# Patient Record
Sex: MALE | Race: AMERICAN INDIAN OR ALASKA NATIVE
[De-identification: names, ages, dates, MRNs, and addresses within clinical notes are randomized per-mention and may not be internally consistent; named-entity substitution may affect disease eponyms.]

---

## 2019-04-15 ENCOUNTER — HOSPITAL ENCOUNTER (INPATIENT)
Dept: HOSPITAL 5 - ED | Age: 44
LOS: 2 days | Discharge: LEFT BEFORE DISCHARGE | DRG: 395 | End: 2019-04-17
Attending: INTERNAL MEDICINE | Admitting: INTERNAL MEDICINE
Payer: SELF-PAY

## 2019-04-15 DIAGNOSIS — E11.65: ICD-10-CM

## 2019-04-15 DIAGNOSIS — Z23: ICD-10-CM

## 2019-04-15 DIAGNOSIS — Z82.49: ICD-10-CM

## 2019-04-15 DIAGNOSIS — F12.90: ICD-10-CM

## 2019-04-15 DIAGNOSIS — K61.0: Primary | ICD-10-CM

## 2019-04-15 DIAGNOSIS — K62.89: ICD-10-CM

## 2019-04-15 DIAGNOSIS — F17.200: ICD-10-CM

## 2019-04-15 LAB
ALBUMIN SERPL-MCNC: 3.3 G/DL (ref 3.9–5)
ALT SERPL-CCNC: 11 UNITS/L (ref 7–56)
BASOPHILS # (AUTO): 0.1 K/MM3 (ref 0–0.1)
BASOPHILS NFR BLD AUTO: 0.5 % (ref 0–1.8)
BUN SERPL-MCNC: 15 MG/DL (ref 9–20)
BUN/CREAT SERPL: 13 %
CALCIUM SERPL-MCNC: 8.8 MG/DL (ref 8.4–10.2)
EOSINOPHIL # BLD AUTO: 0.2 K/MM3 (ref 0–0.4)
EOSINOPHIL NFR BLD AUTO: 1.5 % (ref 0–4.3)
HCT VFR BLD CALC: 33.9 % (ref 35.5–45.6)
HEMOLYSIS INDEX: 10
HGB BLD-MCNC: 11.3 GM/DL (ref 11.8–15.2)
LYMPHOCYTES # BLD AUTO: 0.9 K/MM3 (ref 1.2–5.4)
LYMPHOCYTES NFR BLD AUTO: 9.2 % (ref 13.4–35)
MCHC RBC AUTO-ENTMCNC: 33 % (ref 32–34)
MCV RBC AUTO: 85 FL (ref 84–94)
MONOCYTES # (AUTO): 0.7 K/MM3 (ref 0–0.8)
MONOCYTES % (AUTO): 6.9 % (ref 0–7.3)
PLATELET # BLD: 254 K/MM3 (ref 140–440)
RBC # BLD AUTO: 3.98 M/MM3 (ref 3.65–5.03)

## 2019-04-15 PROCEDURE — 82140 ASSAY OF AMMONIA: CPT

## 2019-04-15 PROCEDURE — 87076 CULTURE ANAEROBE IDENT EACH: CPT

## 2019-04-15 PROCEDURE — 80048 BASIC METABOLIC PNL TOTAL CA: CPT

## 2019-04-15 PROCEDURE — 87116 MYCOBACTERIA CULTURE: CPT

## 2019-04-15 PROCEDURE — 80053 COMPREHEN METABOLIC PANEL: CPT

## 2019-04-15 PROCEDURE — 90732 PPSV23 VACC 2 YRS+ SUBQ/IM: CPT

## 2019-04-15 PROCEDURE — 36415 COLL VENOUS BLD VENIPUNCTURE: CPT

## 2019-04-15 PROCEDURE — 99406 BEHAV CHNG SMOKING 3-10 MIN: CPT

## 2019-04-15 PROCEDURE — 85025 COMPLETE CBC W/AUTO DIFF WBC: CPT

## 2019-04-15 PROCEDURE — 74177 CT ABD & PELVIS W/CONTRAST: CPT

## 2019-04-15 PROCEDURE — 82962 GLUCOSE BLOOD TEST: CPT

## 2019-04-15 PROCEDURE — 87186 SC STD MICRODIL/AGAR DIL: CPT

## 2019-04-15 NOTE — EMERGENCY DEPARTMENT REPORT
<ELIS ROMERO - Last Filed: 04/16/19 03:57>





- General


Chief complaint: Skin/Abscess/Foreign Body


Stated complaint: BUTT SWOLLEN/FEVER


Time Seen by Provider: 04/15/19 21:05


Source: patient


Mode of arrival: Ambulatory


Limitations: No Limitations





- History of Present Illness


Initial comments: 





44-year-old -American male with a past medical history of diabetes comes 

in concerned for an abscess on his buttocks.  Patient reports that this going on

for 4 days.  Patient reports this had a fever.  Patient reports is able to eat 

and drink well.  He is on NovoLog 70/30. 


MD complaint: abscess/boil


-: days(s) (4)


Severity scale (0 -10): 10


Quality: aching, sharp


Improves with: none


Worsens with: palpation


Associated symptoms: fever


Treatments Prior to Arrival: none





- Related Data


                                    Allergies











Allergy/AdvReac Type Severity Reaction Status Date / Time


 


No Known Allergies Allergy   Verified 04/16/19 04:41














Abscess Boil HPI





- HPI


Chief Complaint: Skin/Abscess/Foreign Body


Stated Complaint: BUTT SWOLLEN/FEVER


Time Seen by Provider: 04/15/19 21:05


Allergies/Adverse Reactions: 


                                    Allergies











Allergy/AdvReac Type Severity Reaction Status Date / Time


 


No Known Allergies Allergy   Verified 04/16/19 04:41














ED Review of Systems


Comment: All other systems reviewed and negative


Constitutional: chills, fever


Genitourinary: other (rectal pain)


Musculoskeletal: denies: back pain, joint swelling, arthralgia


Skin: denies: rash, lesions


Neurological: denies: headache, weakness, paresthesias


Psychiatric: denies: anxiety, depression





ED Past Medical Hx





- Past Medical History


Hx Diabetes: Yes





- Social History


Smoking Status: Current Every Day Smoker


Substance Use Type: Marijuana





ED Physical Exam





- General


Limitations: No Limitations


General appearance: alert, in no apparent distress





- Head


Head exam: Present: atraumatic, normocephalic





- Eye


Eye exam: Present: PERRL, EOMI





- ENT


ENT exam: Present: mucous membranes moist





- 


External exam: Present: other (rectal tenderness, superior to the rectum 

tenderness to palpate feels a mild mass most likely a deep abscess)





- Extremities Exam


Extremities exam: Present: normal inspection, full ROM





- Back Exam


Back exam: Present: full ROM





- Neurological Exam


Neurological exam: Present: alert, oriented X3





- Psychiatric


Psychiatric exam: Present: normal affect, normal mood





- Skin


Skin exam: Present: warm, dry, intact, normal color.  Absent: rash





ED Course





- Reevaluation(s)


Reevaluation #1: 





04/16/19 03:57


Spoke to Dr. Tolbert to admit patient and to consult Dr. Krishnan





ED Medical Decision Making





- Lab Data


Result diagrams: 


                                 04/15/19 21:10





                                 04/15/19 21:10





ED Disposition


Clinical Impression: 


 Perianal abscess, Proctitis, Diabetes





Disposition: DC-09 OP ADMIT IP TO THIS [facility]


Condition: Stable





<GERALD MACEDO - Last Filed: 04/16/19 05:07>





ED Review of Systems


ROS: 


Stated complaint: BUTT SWOLLEN/FEVER


Other details as noted in HPI








ED Course


                                   Vital Signs











  04/15/19 04/15/19 04/15/19





  20:10 21:07 23:37


 


Temperature 99.1 F 99 F 


 


Pulse Rate 109 H 106 H 100 H


 


Respiratory 18 18 18





Rate   


 


Blood Pressure 103/53 103/53 


 


Blood Pressure   129/60





[Left]   


 


O2 Sat by Pulse 98 98 98





Oximetry   














- Consultations


Consultation #1: 





04/16/19 03:45


case d/w DR Krishnan will consult, requests hospitalist admission








ED Medical Decision Making





- Lab Data


Result diagrams: 


                                 04/16/19 04:47





                                 04/15/19 21:10





- Medical Decision Making





I also examined pt


area of tenderness with fullness at 12 at the anal area


no external hemorrhoids noted


case d/w Surgery on call Dr terra Lucas ordered


hospitalist to admit


Critical care attestation.: 


If time is entered above; I have spent that time in minutes in the direct care 

of this critically ill patient, excluding procedure time.








ED Disposition


Is pt being admited?: Yes


Time of Disposition: 04:00 (DR Tolbert/hosp)

## 2019-04-15 NOTE — EMERGENCY DEPARTMENT REPORT
Chief Complaint: Skin/Abscess/Foreign Body


Stated Complaint: BUTT SWOLLEN/FEVER


Time Seen by Provider: 04/15/19 21:05





- HPI


History of Present Illness: 








pt presents with rectal pain and pain in the gluteal cleft that began two days 

ago 


pt states he felt a lump


states he could smell a foul odor 


subjective fever


denies any pain, swelling, or erythema to the scrotum 





hx of DM








- Exam


Vital Signs: 


                                   Vital Signs











  04/15/19





  20:10


 


Temperature 99.1 F


 


Pulse Rate 109 H


 


Respiratory 18





Rate 


 


Blood Pressure 103/53


 


O2 Sat by Pulse 98





Oximetry 











MSE screening note: 


Focused history performed 


Due to findings the following was ordered: labs 











ED Disposition for MSE


Condition: Stable

## 2019-04-16 LAB
BASOPHILS # (AUTO): 0 K/MM3 (ref 0–0.1)
BASOPHILS NFR BLD AUTO: 0.3 % (ref 0–1.8)
BUN SERPL-MCNC: 15 MG/DL (ref 9–20)
BUN/CREAT SERPL: 14 %
CALCIUM SERPL-MCNC: 7.8 MG/DL (ref 8.4–10.2)
EOSINOPHIL # BLD AUTO: 0.2 K/MM3 (ref 0–0.4)
EOSINOPHIL NFR BLD AUTO: 1.8 % (ref 0–4.3)
HCT VFR BLD CALC: 32.3 % (ref 35.5–45.6)
HEMOLYSIS INDEX: 2
HGB BLD-MCNC: 10.7 GM/DL (ref 11.8–15.2)
LYMPHOCYTES # BLD AUTO: 1.6 K/MM3 (ref 1.2–5.4)
LYMPHOCYTES NFR BLD AUTO: 14.4 % (ref 13.4–35)
MCHC RBC AUTO-ENTMCNC: 33 % (ref 32–34)
MCV RBC AUTO: 85 FL (ref 84–94)
MONOCYTES # (AUTO): 0.8 K/MM3 (ref 0–0.8)
MONOCYTES % (AUTO): 7 % (ref 0–7.3)
PLATELET # BLD: 245 K/MM3 (ref 140–440)
RBC # BLD AUTO: 3.78 M/MM3 (ref 3.65–5.03)

## 2019-04-16 RX ADMIN — SODIUM CHLORIDE SCH MLS/HR: 0.45 INJECTION, SOLUTION INTRAVENOUS at 08:51

## 2019-04-16 RX ADMIN — METRONIDAZOLE SCH MLS/HR: 5 INJECTION, SOLUTION INTRAVENOUS at 17:13

## 2019-04-16 RX ADMIN — ENOXAPARIN SODIUM SCH MG: 100 INJECTION SUBCUTANEOUS at 10:23

## 2019-04-16 RX ADMIN — Medication SCH ML: at 10:23

## 2019-04-16 RX ADMIN — Medication SCH ML: at 22:11

## 2019-04-16 NOTE — CONSULTATION
REASON FOR CONSULTATION:  Rule out hemorrhoids versus rule out perirectal

abscess versus rule out proctitis.



HISTORY OF PRESENT ILLNESS:  The patient is a 44-year-old juvenile diabetic

male, who was admitted at this time with a chief complaint of perianal pain. 

Also had noticed recent blood in his stools.  The patient states he had noticed

a lump in the perianal area, but it apparently spontaneously drained and the

lump has since disappeared.  The patient states he is "



PAST MEDICAL HISTORY:  Pertinent for juvenile diabetes.  Also, left eye

blindness.



PAST SURGICAL HISTORY:  Status post left eye surgery secondary to retinal

detachment.  Left eye currently blind.



ALLERGIES:  No known allergies.



MEDICATIONS:  Insulin.



FAMILY HISTORY:  Diabetes and hypertension.



SOCIAL HISTORY:  Smokes cigars.  Occasional marijuana.  Denies any cigarette

smoking or ethanol intake.



PHYSICAL EXAMINATION:

GENERAL:  At this time revealed the patient to be awake, alert, cooperative,

lying in left lateral decubitus position.  No acute distress.

VITAL SIGNS:  Show him to be afebrile with a temperature of 98.4, blood pressure

130/71, pulse of 84, respirations of 17.

ABDOMEN:  Examination of the rectum reveals some tenderness and a very small

area of induration at approximately 12 o' clock in the jackknife position. 

However, there is no fluctuance or drainage noted at this time.  The

lore-gluteal area reveals no signs of a gluteal abscess or perirectal abscess.



LABORATORY DATA:  Lab work at present includes a CBC, which shows a white count

of 11.2, H and H is 10.7 and 32.3.  Blood sugar is elevated at 414. 

Electrolytes are essentially within normal limits.  BUN is 15 and creatinine is

1.1.  A CT scan of the abdomen had been performed, whose impression reads,

prominence in the mucosa in the rectum suspicious for proctitis.  There is a

small 2 cm low density focus in the perianal region, which may represent a small

abscess; however, that CT cuts were not done low enough to properly delineate.



IMPRESSION:

1.  At this time is that of a 44-year-old juvenile onset diabetic rule out small

perianal abscess, which has spontaneously drained.

2.  Rule out proctitis.



RECOMMENDATIONS:  At this time is that to continue present care of IV

antibiotics.  Also, we will begin Sitz baths q.i.d.  Glucose monitoring and

blood sugar control.  We will also obtain GI evaluation for proctitis, possible

flexible sigmoidoscopy or even possible colonoscopy.  We will follow up the

white count in the morning and monitor clinically.  I will follow up with you.



Thank you very much for consultation.





DD: 04/16/2019 07:51

DT: 04/16/2019 17:02

JOB# [id number]  [id number]

BÁRBARA/KENNY

## 2019-04-16 NOTE — GASTROENTEROLOGY CONSULTATION
<TIM GREEN - Last Filed: 04/16/19 12:38>





History of Present Illness





- Reason for Consult


Consult date: 04/16/19


proctitis


Requesting physician: AGUEDA BARNES





- History of Present Illness


Patient is a 43 y/o male with PMH of diabetes who presented to ED with c/o 

rectal pain with associated bloody drainage, odorous smell, and fever. Upon 

admission he underwent an abd CT that showed a small perianal abscess and 

proctitis to which he was admitted. Surgery following with recommendations for 

further evaluation of proctitis with possible endoscopic evaluation (flex 

sig/colonoscopy) to which GI has been consulted. This afternoon patient was 

sitting in bedside chair w/o acute distress. He reports feeling better with 

rectal pain improving on antibiotics. No active rectal bleeding. Admits to 

recent fever and wt loss of approximately 10-15lbs over the last few months but 

states his wt normally fluctuates like this every year. Denies CP, SOB, 

abdominal pain, N/V, melena, diarrhea, constipation, or rectal trauma. No hx or 

Fhx of IBD. No Fhx of colon cancer. No prior colonoscopy. 








Past History


Past Medical History: diabetes


Past Surgical History: Other (eye surgery)


Social history: smoking, other (Marijuana).  denies: alcohol abuse


Family history: hypertension





Medications and Allergies


                                    Allergies











Allergy/AdvReac Type Severity Reaction Status Date / Time


 


No Known Allergies Allergy   Verified 04/16/19 04:41











                                Home Medications











 Medication  Instructions  Recorded  Confirmed  Last Taken  Type


 


Insulin NPH Hum/Reg Insulin Hm 15 units SUB-Q BID 04/16/19 04/16/19 1 Day Ago 

History





[Humulin 70-30 Vial]    ~04/15/19 











Active Meds: 


Active Medications





Acetaminophen (Tylenol)  650 mg PO Q4H PRN


   PRN Reason: Pain MILD(1-3)/Fever >100.5/HA


Dextrose (D50w (25gm) Syringe)  50 ml IV PRN PRN


   PRN Reason: Hypoglycemia


Enoxaparin Sodium (Lovenox)  40 mg SUB-Q QDAY@1000 [facility]IDA


   Last Admin: 04/16/19 10:23 Dose:  40 mg


   Documented by: 


Sodium Chloride (Nacl 0.45% 1000 Ml)  1,000 mls @ 100 mls/hr IV AS DIRECT [facility]


   Last Admin: 04/16/19 08:51 Dose:  100 mls/hr


   Documented by: 


Piperacillin Sod/Tazobactam Sod (Zosyn/Ns 4.5gm/100ml)  4.5 gm in 100 mls @ 200 

mls/hr IV Q8H [facility]; Protocol


   Last Admin: 04/16/19 11:50 Dose:  200 mls/hr


   Documented by: 


Insulin Human Lispro (Humalog)  0 unit SUB-Q ACHS [facility]; Protocol


   Last Admin: 04/16/19 08:48 Dose:  10 unit


   Documented by: 


Morphine Sulfate (Morphine)  2 mg IV Q4H PRN


   PRN Reason: Pain, Moderate (4-6)


Ondansetron HCl (Zofran)  4 mg IV Q8H PRN


   PRN Reason: Nausea And Vomiting


Pneumococcal Polyvalent Vaccine (Pneumovax 23)  0.5 ml IM .ONCE ONE


   Stop: 04/17/19 12:01


Sodium Chloride (Sodium Chloride Flush Syringe 10 Ml)  10 ml IV BID [facility]


   Last Admin: 04/16/19 10:23 Dose:  10 ml


   Documented by: 


Sodium Chloride (Sodium Chloride Flush Syringe 10 Ml)  10 ml IV PRN PRN


   PRN Reason: LINE FLUSH





medications reviewed/updated as required





Review of Systems





- Review of Systems


All systems: negative


Gastrointestinal: other (rectal pain)





Exam





- Constitutional


Vital Signs: 


                                        











Temp Pulse Resp BP Pulse Ox


 


 98.3 F   89   20   118/74   98 


 


 04/16/19 11:51  04/16/19 11:50  04/16/19 11:51  04/16/19 11:51  04/16/19 11:50











General appearance: no acute distress





- Respiratory


Respiratory: bilateral: CTA





- Cardiovascular


Rhythm: regular





- Gastrointestinal


General gastrointestinal: Present: soft, non-tender, non-distended, normal bowel

sounds





- Neurologic


Neurological: alert and oriented x3





- Labs


CBC & Chem 7: 


                                 04/16/19 04:47





                                 04/16/19 04:47


Lab Results: 


                         Laboratory Results - last 24 hr











  04/15/19 04/15/19 04/15/19





  21:10 21:10 21:10


 


WBC  10.3  


 


RBC  3.98  


 


Hgb  11.3 L  


 


Hct  33.9 L  


 


MCV  85  


 


MCH  29  


 


MCHC  33  


 


RDW  13.5  


 


Plt Count  254  


 


Lymph % (Auto)  9.2 L  


 


Mono % (Auto)  6.9  


 


Eos % (Auto)  1.5  


 


Baso % (Auto)  0.5  


 


Lymph #  0.9 L  


 


Mono #  0.7  


 


Eos #  0.2  


 


Baso #  0.1  


 


Seg Neutrophils %  81.9 H  


 


Seg Neutrophils #  8.4 H  


 


Sodium   136 L 


 


Potassium   4.0 


 


Chloride   97.8 L 


 


Carbon Dioxide   27 


 


Anion Gap   15 


 


BUN   15 


 


Creatinine   1.2 


 


Estimated GFR   > 60 


 


BUN/Creatinine Ratio   13 


 


Glucose   260 H 


 


POC Glucose   


 


Lactic Acid    1.70


 


Calcium   8.8 


 


Total Bilirubin   0.40 


 


AST   17 


 


ALT   11 


 


Alkaline Phosphatase   143 H 


 


Total Protein   6.6 


 


Albumin   3.3 L 


 


Albumin/Globulin Ratio   1.0 














  04/16/19 04/16/19 04/16/19





  04:47 04:47 08:03


 


WBC  11.2 H  


 


RBC  3.78  


 


Hgb  10.7 L  


 


Hct  32.3 L  


 


MCV  85  


 


MCH  28  


 


MCHC  33  


 


RDW  14.0  


 


Plt Count  245  


 


Lymph % (Auto)  14.4  


 


Mono % (Auto)  7.0  


 


Eos % (Auto)  1.8  


 


Baso % (Auto)  0.3  


 


Lymph #  1.6  


 


Mono #  0.8  


 


Eos #  0.2  


 


Baso #  0.0  


 


Seg Neutrophils %  76.5 H  


 


Seg Neutrophils #  8.6 H  


 


Sodium   134 L 


 


Potassium   4.3 


 


Chloride   98.9 


 


Carbon Dioxide   26 


 


Anion Gap   13 


 


BUN   15 


 


Creatinine   1.1 


 


Estimated GFR   > 60 


 


BUN/Creatinine Ratio   14 


 


Glucose   414 H 


 


POC Glucose    402 H


 


Lactic Acid   


 


Calcium   7.8 L 


 


Total Bilirubin   


 


AST   


 


ALT   


 


Alkaline Phosphatase   


 


Total Protein   


 


Albumin   


 


Albumin/Globulin Ratio   














  04/16/19





  11:55


 


WBC 


 


RBC 


 


Hgb 


 


Hct 


 


MCV 


 


MCH 


 


MCHC 


 


RDW 


 


Plt Count 


 


Lymph % (Auto) 


 


Mono % (Auto) 


 


Eos % (Auto) 


 


Baso % (Auto) 


 


Lymph # 


 


Mono # 


 


Eos # 


 


Baso # 


 


Seg Neutrophils % 


 


Seg Neutrophils # 


 


Sodium 


 


Potassium 


 


Chloride 


 


Carbon Dioxide 


 


Anion Gap 


 


BUN 


 


Creatinine 


 


Estimated GFR 


 


BUN/Creatinine Ratio 


 


Glucose 


 


POC Glucose  330 H


 


Lactic Acid 


 


Calcium 


 


Total Bilirubin 


 


AST 


 


ALT 


 


Alkaline Phosphatase 


 


Total Protein 


 


Albumin 


 


Albumin/Globulin Ratio 














Assessment and Plan


1.proctitis


 2.perianal abscess


 3.DM





-afebrile


-WBC 11.2


-H/H 10.7/32.3-no active signs of bleeding


-abd CT showed perianal abscess and proctitis


-surgery following


-etiology-possibly infectious vs other


-will schedule for colonoscopy tomorrow for further evaluation


-clear liquids today, then NPO after MN


-continue to trend labs and supportive care (sitz baths, antibiotics, etc.)


-will follow








<RADHA CORDOVA - Last Filed: 04/17/19 09:23>





Medications and Allergies


Active Meds: 


Active Medications





Acetaminophen (Tylenol)  650 mg PO Q4H PRN


   PRN Reason: Pain MILD(1-3)/Fever >100.5/HA


   Last Admin: 04/16/19 22:02 Dose:  650 mg


   Documented by: 


Dextrose (D50w (25gm) Syringe)  50 ml IV PRN PRN


   PRN Reason: Hypoglycemia


Enoxaparin Sodium (Lovenox)  40 mg SUB-Q QDAY@1000 [facility]IDA


   Last Admin: 04/16/19 10:23 Dose:  40 mg


   Documented by: 


Sodium Chloride (Nacl 0.45% 1000 Ml)  1,000 mls @ 100 mls/hr IV AS DIRECT [facility]


   Last Admin: 04/17/19 07:35 Dose:  100 mls/hr


   Documented by: 


Ceftriaxone Sodium (Rocephin/Ns 2 Gm/100 Ml)  2 gm in 100 mls @ 200 mls/hr IV 

Q24H [facility]; Protocol


   Last Admin: 04/16/19 18:14 Dose:  200 mls/hr


   Documented by: 


Metronidazole (Flagyl 500 Mg/100 Ml)  500 mg in 100 mls @ 100 mls/hr IV Q8H [facility];

Protocol


   Last Admin: 04/17/19 05:54 Dose:  100 mls/hr


   Documented by: 


Vancomycin HCl 1,500 mg/ (Sodium Chloride)  530 mls @ 265 mls/hr IV Q24H [facility]


   Last Admin: 04/17/19 01:38 Dose:  265 mls/hr


   Documented by: 


Sodium Chloride (Nacl 0.9% 1000 Ml)  1,000 mls @ 50 mls/hr IV AS DIRECT [facility]


   Stop: 04/18/19 07:59


Insulin Human Lispro (Humalog)  0 unit SUB-Q ACHS [facility]; Protocol


   Last Admin: 04/17/19 07:40 Dose:  Not Given


   Documented by: 


Insulin Human NPH (Humulin N)  10 unit SUB-Q BIDDIAB [facility]


   Last Admin: 04/17/19 07:40 Dose:  Not Given


   Documented by: 


Morphine Sulfate (Morphine)  2 mg IV Q4H PRN


   PRN Reason: Pain, Moderate (4-6)


Ondansetron HCl (Zofran)  4 mg IV Q8H PRN


   PRN Reason: Nausea And Vomiting


Pneumococcal Polyvalent Vaccine (Pneumovax 23)  0.5 ml IM .ONCE ONE


   Stop: 04/17/19 12:01


Sodium Chloride (Sodium Chloride Flush Syringe 10 Ml)  10 ml IV BID [facility]


   Last Admin: 04/16/19 22:11 Dose:  10 ml


   Documented by: 


Sodium Chloride (Sodium Chloride Flush Syringe 10 Ml)  10 ml IV PRN PRN


   PRN Reason: LINE FLUSH











Exam





- Constitutional


Vital Signs: 


                                        











Temp Pulse Resp BP Pulse Ox


 


 98.8 F   84   20   142/77   99 


 


 04/17/19 05:53  04/17/19 05:53  04/17/19 05:53  04/17/19 05:53  04/17/19 05:53














- Labs


CBC & Chem 7: 


                                 04/17/19 05:25





                                 04/16/19 04:47


Lab Results: 


                         Laboratory Results - last 24 hr











  04/16/19 04/16/19 04/16/19





  11:55 17:36 21:22


 


WBC   


 


RBC   


 


Hgb   


 


Hct   


 


MCV   


 


MCH   


 


MCHC   


 


RDW   


 


Plt Count   


 


Lymph % (Auto)   


 


Mono % (Auto)   


 


Eos % (Auto)   


 


Baso % (Auto)   


 


Lymph #   


 


Mono #   


 


Eos #   


 


Baso #   


 


Seg Neutrophils %   


 


Seg Neutrophils #   


 


POC Glucose  330 H  311 H  213 H














  04/17/19 04/17/19





  05:25 07:36


 


WBC  10.3 


 


RBC  3.56 L 


 


Hgb  10.1 L 


 


Hct  30.1 L 


 


MCV  84 


 


MCH  29 


 


MCHC  34 


 


RDW  13.8 


 


Plt Count  254 


 


Lymph % (Auto)  17.6 


 


Mono % (Auto)  8.1 H 


 


Eos % (Auto)  2.5 


 


Baso % (Auto)  0.3 


 


Lymph #  1.8 


 


Mono #  0.8 


 


Eos #  0.3 


 


Baso #  0.0 


 


Seg Neutrophils %  71.5 H 


 


Seg Neutrophils #  7.4 


 


POC Glucose   296 H














Assessment and Plan


Patient seen and examined on 4/16; r/o IBD; possibly infectious etiology.  plan 

for flex sig

## 2019-04-16 NOTE — PROGRESS NOTE
Assessment and Plan


Assessment and plan: 





44-year-old man who presented with rectal pain and pain in the gluteal cleft 2 

days, associated with foul odor, fever and malaise





Diagnoses


Type 2 diabetes, uncontrolled with persistent hyperglycemia


Proctitis with perianal abscess





Plan


IV antibiotics, general surgery input appreciated, recommended sitz bath, 

antibiotics and glycemic control


GI input appreciated, for colonoscopy tomorrow X


DVT prophylaxis, chemical





History


Interval history: 


Perirectal pain is improved


Review of systems


Constitutional: No fevers, no malaise, no joint pains





CVS: No chest pain, no orthopnea, no dyspnea on exertion, no pedal edema





GI: No abdominal pain, no diarrhea, no vomiting, no constipation





Respiratory: No shortness of breath, no wheezing, no coughing





Hospitalist Physical





- Physical exam


Narrative exam: 





General.: Appears well, no distress, nontoxic


HEENT: Moist mucous membranes, extraocular muscles intact, no lymphadenopathy


Neck: supple


Cardiac: S1-S2 heard


Lungs: clear to auscultation bilaterally


Abdomen: soft , nontender,  nondistended,  bowel sounds positive


Extremities: no edema clubbing or cyanosis


Skin: no rash or lesions


Neurologic: no gross focal deficits


Psych: calm, and cooperative





- Constitutional


Vitals: 


                                        











Temp Pulse Resp BP Pulse Ox


 


 98.3 F   89   20   118/74   98 


 


 04/16/19 11:51  04/16/19 11:50  04/16/19 11:51  04/16/19 11:51  04/16/19 11:50














Results





- Labs


CBC & Chem 7: 


                                 04/17/19 05:25





                                 04/16/19 04:47


Labs: 


                             Laboratory Last Values











WBC  11.2 K/mm3 (4.5-11.0)  H  04/16/19  04:47    


 


RBC  3.78 M/mm3 (3.65-5.03)   04/16/19  04:47    


 


Hgb  10.7 gm/dl (11.8-15.2)  L  04/16/19  04:47    


 


Hct  32.3 % (35.5-45.6)  L  04/16/19  04:47    


 


MCV  85 fl (84-94)   04/16/19  04:47    


 


MCH  28 pg (28-32)   04/16/19  04:47    


 


MCHC  33 % (32-34)   04/16/19  04:47    


 


RDW  14.0 % (13.2-15.2)   04/16/19  04:47    


 


Plt Count  245 K/mm3 (140-440)   04/16/19  04:47    


 


Lymph % (Auto)  14.4 % (13.4-35.0)   04/16/19  04:47    


 


Mono % (Auto)  7.0 % (0.0-7.3)   04/16/19  04:47    


 


Eos % (Auto)  1.8 % (0.0-4.3)   04/16/19  04:47    


 


Baso % (Auto)  0.3 % (0.0-1.8)   04/16/19  04:47    


 


Lymph #  1.6 K/mm3 (1.2-5.4)   04/16/19  04:47    


 


Mono #  0.8 K/mm3 (0.0-0.8)   04/16/19  04:47    


 


Eos #  0.2 K/mm3 (0.0-0.4)   04/16/19  04:47    


 


Baso #  0.0 K/mm3 (0.0-0.1)   04/16/19  04:47    


 


Seg Neutrophils %  76.5 % (40.0-70.0)  H  04/16/19  04:47    


 


Seg Neutrophils #  8.6 K/mm3 (1.8-7.7)  H  04/16/19  04:47    


 


Sodium  134 mmol/L (137-145)  L  04/16/19  04:47    


 


Potassium  4.3 mmol/L (3.6-5.0)   04/16/19  04:47    


 


Chloride  98.9 mmol/L ()   04/16/19  04:47    


 


Carbon Dioxide  26 mmol/L (22-30)   04/16/19  04:47    


 


Anion Gap  13 mmol/L  04/16/19  04:47    


 


BUN  15 mg/dL (9-20)   04/16/19  04:47    


 


Creatinine  1.1 mg/dL (0.8-1.5)   04/16/19  04:47    


 


Estimated GFR  > 60 ml/min  04/16/19  04:47    


 


BUN/Creatinine Ratio  14 %  04/16/19  04:47    


 


Glucose  414 mg/dL ()  H  04/16/19  04:47    


 


POC Glucose  330  ()  H  04/16/19  11:55    


 


Lactic Acid  1.70 mmol/L (0.7-2.0)   04/15/19  21:10    


 


Calcium  7.8 mg/dL (8.4-10.2)  L  04/16/19  04:47    


 


Total Bilirubin  0.40 mg/dL (0.1-1.2)   04/15/19  21:10    


 


AST  17 units/L (5-40)   04/15/19  21:10    


 


ALT  11 units/L (7-56)   04/15/19  21:10    


 


Alkaline Phosphatase  143 units/L ()  H  04/15/19  21:10    


 


Total Protein  6.6 g/dL (6.3-8.2)   04/15/19  21:10    


 


Albumin  3.3 g/dL (3.9-5)  L  04/15/19  21:10    


 


Albumin/Globulin Ratio  1.0 %  04/15/19  21:10    














Active Medications





- Current Medications


Current Medications: 














Generic Name Dose Route Start Last Admin





  Trade Name Freq  PRN Reason Stop Dose Admin


 


Acetaminophen  650 mg  04/16/19 04:40 





  Tylenol  PO  





  Q4H PRN  





  Pain MILD(1-3)/Fever >100.5/HA  


 


Dextrose  50 ml  04/16/19 04:40 





  D50w (25gm) Syringe  IV  





  PRN PRN  





  Hypoglycemia  


 


Enoxaparin Sodium  40 mg  04/16/19 10:00  04/16/19 10:23





  Lovenox  SUB-Q   40 mg





  QDAY@1000 ELIDA   Administration


 


Sodium Chloride  1,000 mls @ 100 mls/hr  04/16/19 05:00  04/16/19 08:51





  Nacl 0.45% 1000 Ml  IV   100 mls/hr





  AS DIRECT ELIDA   Administration


 


Piperacillin Sod/Tazobactam Sod  4.5 gm in 100 mls @ 200 mls/hr  04/16/19 12:00 

04/16/19 11:50





  Zosyn/Ns 4.5gm/100ml  IV   200 mls/hr





  Q8H ELIDA   Administration





  Protocol  


 


Insulin Human Lispro  0 unit  04/16/19 07:30  04/16/19 12:37





  Humalog  SUB-Q   8 unit





  [facility] ELIDA   Administration





  Protocol  


 


Morphine Sulfate  2 mg  04/16/19 04:40 





  Morphine  IV  





  Q4H PRN  





  Pain, Moderate (4-6)  


 


Ondansetron HCl  4 mg  04/16/19 04:40 





  Zofran  IV  





  Q8H PRN  





  Nausea And Vomiting  


 


Pneumococcal Polyvalent Vaccine  0.5 ml  04/17/19 12:00 





  Pneumovax 23  IM  04/17/19 12:01 





  .ONCE ONE  


 


Sodium Chloride  10 ml  04/16/19 10:00  04/16/19 10:23





  Sodium Chloride Flush Syringe 10 Ml  IV   10 ml





  BID ELIDA   Administration


 


Sodium Chloride  10 ml  04/16/19 04:40 





  Sodium Chloride Flush Syringe 10 Ml  IV  





  PRN PRN  





  LINE FLUSH  














Nutrition/Malnutrition Assess





- Dietary Evaluation


Nutrition/Malnutrition Findings: 


                                        





Nutrition Notes                                            Start:  04/16/19 

10:17


Freq:                                                      Status: Active       




Protocol:                                                                       




 Document     04/16/19 10:17  CP  (Rec: 04/16/19 10:17  CP  [id number])


 Co-Sign      04/16/19 10:17  LP


 Nutrition Notes


     Need for Assessment generated from:         Low BMI


     Initial or Follow up                        Brief Note


     Subjective/Other Information                RD screen for low BMI. Pt has


                                                 a BMI of 28.1.


 Nutrition Intervention


     Revisit per MD consult or patient           Sign Off


      request:

## 2019-04-16 NOTE — HISTORY AND PHYSICAL REPORT
History of Present Illness


Date of examination: 04/16/19


History of present illness: 


44-year-old male with a history of diabetes comes emergency room with complaints

of tenderness in the rectal area that started on Saturday.  He is having 

difficulty sitting.  He stated that when the symptoms started, he did sitz baths

because he thought he had a hemorrhoid.  Yesterday he noticed bloody drainage 

from the area, mild odorous smell, admits to fever


Review of systems


Constitutional: no  weight loss, chills


Ears, eyes, nose, mouth and throat: no nasal congestion, no nasal discharge, no 

sinus pressure, no vision change, no red eye.


Neck: No neck pain or rigidity.


Cardiovascular: no palpitations, chest pain


Respiratory: no cough, shortness of breath


Gastrointestinal: no hematochezia, abdominal pain


Genitourinary : no  frequency , no hematuria


Musculoskeletal: no joint swelling or muscle ache 


Integumentary: no rash, no pruritis


Neurological: no parathesias, no focal weakness


Endocrine: no cold or heat intolerance, no polyuria or polydipsia


Hematologic/Lymphatic: no easy bruising, no easy bleeding, no gland swelling


Allergic/Immunologic: no urticaria, no angioedema..





PAST MEDICAL HISTORY: diabetes





PAST SURGICAL HISTORY: None 





SOCIAL HISTORY: Denies alcohol, positive marijuana, +tobacco





FAMILY HISTORY: Hypertension








Medications and Allergies


                                    Allergies











Allergy/AdvReac Type Severity Reaction Status Date / Time


 


No Known Allergies Allergy   Verified 04/16/19 04:41











                                Home Medications











 Medication  Instructions  Recorded  Confirmed  Last Taken  Type


 


Insulin NPH Hum/Reg Insulin Hm 15 units SUB-Q BID 04/16/19 04/16/19 1 Day Ago 

History





[Humulin 70-30 Vial]    ~04/15/19 











Active Meds: 


Active Medications





Enoxaparin Sodium (Lovenox)  30 mg SUB-Q QDAY ELIDA


Vancomycin HCl (Vancomycin/Ns 1 Gm/250 Ml)  1 gm in 250 mls @ 167.007 mls/hr IV 

ONCE ONE; Protocol


   Stop: 04/16/19 06:09











Exam





- Physical Exam


Narrative exam: 


General Apperance: The patient lying in bed,  breathing comfortable 


HEENT: Normocephalic, atraumatic.  Pupils equally round and reactive to light, 

EOMI, no sclericterus or JVD or thyromegaly or nodule.  , no carotid bruit, 

mucous membranes moist, no exudate or erythema


Heart: S1-S2, regular is rhythm 


Lungs: Clear to auscultation bilaterally, breathing comfortable


Abdomen: Positive bowel sounds, soft, nontender, nondistended, no organomegaly


Extremities: No edema cyanosis clubbing


Skin: no rash, nodule, warm and dry


rectal: Tenderness and the perirectal area, fluctuant, no erythema or drainage


Neuro: cranial nerves 2-12 intact, speech is fluent, motor/sensory intact








- Constitutional


Vitals: 


                                        











Temp Pulse Resp BP Pulse Ox


 


 99 F   100 H  18   129/60   98 


 


 04/15/19 21:07  04/15/19 23:37  04/15/19 23:37  04/15/19 23:37  04/15/19 23:37














Results





- Labs


CBC & Chem 7: 


                                 04/17/19 05:25





                                 04/16/19 04:47


Labs: 


                              Abnormal lab results











  04/15/19 04/15/19 Range/Units





  21:10 21:10 


 


Hgb  11.3 L   (11.8-15.2)  gm/dl


 


Hct  33.9 L   (35.5-45.6)  %


 


Lymph % (Auto)  9.2 L   (13.4-35.0)  %


 


Lymph #  0.9 L   (1.2-5.4)  K/mm3


 


Seg Neutrophils %  81.9 H   (40.0-70.0)  %


 


Seg Neutrophils #  8.4 H   (1.8-7.7)  K/mm3


 


Sodium   136 L  (137-145)  mmol/L


 


Chloride   97.8 L  ()  mmol/L


 


Glucose   260 H  ()  mg/dL


 


Alkaline Phosphatase   143 H  ()  units/L


 


Albumin   3.3 L  (3.9-5)  g/dL














Assessment and Plan


CAT scan of the pelvis reviewed


Assessment


Proctitis with possible perianal abscess


Diabetes


Plan


Start IV Zosyn, give vancomycin, follow cultures


Surgery was consulted to see the patient


Check fingersticks initiate insulin sliding scale


DVT prophylaxis, IV morphine


Follow-up medication reconciliation

## 2019-04-16 NOTE — CONSULTATION
History of Present Illness





- Reason for Consult


Consult date: 04/16/19


perianal abscess


Requesting physician: KRISTI VIVAR





- History of Present Illness


The patient is a 44-year-old male with diabetes that presented to the emergency 

room with complaints of pain in the rectal region that started this past 

weekend. He initially thought it was a hemorrhoid however then he started having

some bloody drainage along with foul-smelling drainage with a fever and hence he

came to the emergency room. He underwent a CT scan of the abdomen and pelvis 

which showed a small perianal abscess with proctitis and hence he was 

hospitalized. He was empirically started on IV Zosyn and vancomycin. GI and 

surgery were consulted. ID was consulted for antibiotic recommendations. He has 

been continuing Sitz baths in the hospital and reports spontaneous drainage. 





He denies any MSM behavior. Denies any prior episodes of perianal abscess. 

Denies IBD history.





Review of Systems: 


General: no fevers, chills or rigors currently. Fever prior to admission


HEENT: no new visual disturbance


Respiratory: No cough, sputum, hemoptysis or shortness of breath


Cardiovascular: No chest pain, syncope


Gastrointestinal: No nausea, vomiting or diarrhea


Genitourinary: No dysuria or hematuria


Musculoskeletal: No new or worsening neck pain or back pain 


Neurologic: No headaches, seizures


Hematologic: No easy bruising or bleeding


Endocrine: No night sweats. + for weight loss


Skin: negative for rash, jaundice


Psychiatric: No suicidal or homicidal ideation








Past History


Past Medical History: diabetes


Past Surgical History: Other (eye surgery)


Social history: smoking, other (Marijuana).  denies: alcohol abuse


Family history: hypertension





Medications and Allergies


                                    Allergies











Allergy/AdvReac Type Severity Reaction Status Date / Time


 


No Known Allergies Allergy   Verified 04/16/19 04:41











                                Home Medications











 Medication  Instructions  Recorded  Confirmed  Last Taken  Type


 


Insulin NPH Hum/Reg Insulin Hm 15 units SUB-Q BID 04/16/19 04/16/19 1 Day Ago 

History





[Humulin 70-30 Vial]    ~04/15/19 











Active Meds: 


Active Medications





Acetaminophen (Tylenol)  650 mg PO Q4H PRN


   PRN Reason: Pain MILD(1-3)/Fever >100.5/HA


Dextrose (D50w (25gm) Syringe)  50 ml IV PRN PRN


   PRN Reason: Hypoglycemia


Enoxaparin Sodium (Lovenox)  40 mg SUB-Q QDAY@1000 ELIDA


   Last Admin: 04/16/19 10:23 Dose:  40 mg


   Documented by: 


Sodium Chloride (Nacl 0.45% 1000 Ml)  1,000 mls @ 100 mls/hr IV AS DIRECT [facility]


   Last Admin: 04/16/19 08:51 Dose:  100 mls/hr


   Documented by: 


Ceftriaxone Sodium (Rocephin/Ns 2 Gm/100 Ml)  2 gm in 100 mls @ 200 mls/hr IV 

Q24HR ELIDA; Protocol


Metronidazole (Flagyl 500 Mg/100 Ml)  500 mg in 100 mls @ 100 mls/hr IV Q8HR 

ELIDA; Protocol


Insulin Human Lispro (Humalog)  0 unit SUB-Q ACHS ELIDA; Protocol


   Last Admin: 04/16/19 12:37 Dose:  8 unit


   Documented by: 


Insulin Human NPH (Humulin N)  10 unit SUB-Q BIDDIAB [facility]


Morphine Sulfate (Morphine)  2 mg IV Q4H PRN


   PRN Reason: Pain, Moderate (4-6)


Ondansetron HCl (Zofran)  4 mg IV Q8H PRN


   PRN Reason: Nausea And Vomiting


Pneumococcal Polyvalent Vaccine (Pneumovax 23)  0.5 ml IM .ONCE ONE


   Stop: 04/17/19 12:01


Sodium Chloride (Sodium Chloride Flush Syringe 10 Ml)  10 ml IV BID [facility]


   Last Admin: 04/16/19 10:23 Dose:  10 ml


   Documented by: 


Sodium Chloride (Sodium Chloride Flush Syringe 10 Ml)  10 ml IV PRN PRN


   PRN Reason: LINE FLUSH











Physical Examination





- Physical Exam


Narrative exam: 





Physical Exam: 


Constitutional: Alert, cooperative. No acute distress


Head, Ears, Nose: Normocephalic, atraumatic. External ears, nose normal


Eyes: Conjunctivae/corneas clear. No icterus. No ptosis.


Neck: Supple, no meningeal signs


Oral: no thrush, no ulcers


Cardiovascular: S1, S2 normal. 


Respiratory: Good air entry, clear to auscultation bilaterally


GI: Soft, non-tender; bowel sounds normal. No peritoneal signs. Perianal region 

with induration, severe tenderness, no active drainage


Musculoskeletal: No pedal edema, no cyanosis.


Skin: No rash or abscess


Hem/Lymphatic: No palpable cervical or supraclavicular nodes. No lymphangitis


Psych: Mood ok. Affect normal


Neurological: Awake, alert, oriented. No gross abnormality





- Constitutional


Vitals: 


                                   Vital Signs











Temp Pulse Resp BP Pulse Ox


 


 98.3 F   89   20   118/74   98 


 


 04/16/19 11:51  04/16/19 11:50  04/16/19 11:51  04/16/19 11:51  04/16/19 11:50








                           Temperature -Last 24 Hours











Temperature                    98.3 F


 


Temperature                    98.3 F


 


Temperature                    98.1 F


 


Temperature                    98.4 F


 


Temperature                    99 F


 


Temperature                    99.1 F

















Results





- Labs


CBC & Chem 7: 


                                 04/16/19 04:47





                                 04/16/19 04:47


Labs: 


                              Abnormal lab results











  04/15/19 04/15/19 04/16/19 Range/Units





  21:10 21:10 04:47 


 


WBC    11.2 H  (4.5-11.0)  K/mm3


 


Hgb  11.3 L   10.7 L  (11.8-15.2)  gm/dl


 


Hct  33.9 L   32.3 L  (35.5-45.6)  %


 


Lymph % (Auto)  9.2 L    (13.4-35.0)  %


 


Lymph #  0.9 L    (1.2-5.4)  K/mm3


 


Seg Neutrophils %  81.9 H   76.5 H  (40.0-70.0)  %


 


Seg Neutrophils #  8.4 H   8.6 H  (1.8-7.7)  K/mm3


 


Sodium   136 L   (137-145)  mmol/L


 


Chloride   97.8 L   ()  mmol/L


 


Glucose   260 H   ()  mg/dL


 


POC Glucose     ()  


 


Calcium     (8.4-10.2)  mg/dL


 


Alkaline Phosphatase   143 H   ()  units/L


 


Albumin   3.3 L   (3.9-5)  g/dL














  04/16/19 04/16/19 04/16/19 Range/Units





  04:47 08:03 11:55 


 


WBC     (4.5-11.0)  K/mm3


 


Hgb     (11.8-15.2)  gm/dl


 


Hct     (35.5-45.6)  %


 


Lymph % (Auto)     (13.4-35.0)  %


 


Lymph #     (1.2-5.4)  K/mm3


 


Seg Neutrophils %     (40.0-70.0)  %


 


Seg Neutrophils #     (1.8-7.7)  K/mm3


 


Sodium  134 L    (137-145)  mmol/L


 


Chloride     ()  mmol/L


 


Glucose  414 H    ()  mg/dL


 


POC Glucose   402 H  330 H  ()  


 


Calcium  7.8 L    (8.4-10.2)  mg/dL


 


Alkaline Phosphatase     ()  units/L


 


Albumin     (3.9-5)  g/dL














- Imaging and Cardiology


CT scan - abdomen: report reviewed, image reviewed (CT abdomen and pelvis showed

proctitis and a small 2 cm lore-anal abscess)





Assessment and Plan





Cultures:


None this admission





A/P:


44-year-old male with diabetes admitted with:





1) Proctitis with perianal abscess: noted on CT. Spontaneously draining. GI and 

General Surgery following. No h/o IBD. No h/o MSM behavior. 





2) DM with hyperglycemia





Recs:


local care with sitz baths


culture swab collected


empiric abx: Ceftriaxone, Flagyl and Vancomycin (MRSA common cause of lore-anal 

abscess)





Will follow.





MD [facility] 


C: [phone]


O: [phone]


F: [phone]

## 2019-04-16 NOTE — PROGRESS NOTE
Assessment and Plan





Full consult dictated





43 y/o juvenile DM male.  c/o anal pain.  states area spontaneously drained and 

now feels "much better"





rectal exam -  slight area of induration and tenderness 12 o'clock in jackknife 

position but no fluctuance or active drainage noted.





CT - proctitis





imp -  r/o proctitis


r/o small,  spontaneously drained lore anal absecess





pt clinically improving





rec-





begin sitz bath qid


continue antibiotics


glucose monitoring and control





GI eval possible flex sig/colonoscopy








f/u wbc in am





will follow 





History of present illness: 


Put 4-year-old male with a history of diabetes comes emergency room with 

complaints of tenderness in the rectal area that started on Saturday.  He is 

having difficulty sitting.  He stated that when the symptoms started, he did 

sitz baths because he thought he had a hemorrhoid.  Yesterday he noticed bloody 

drainage from the area, mild odorous smell, admits to fever


Review of systems


Constitutional: no  weight loss, chills


Ears, eyes, nose, mouth and throat: no nasal congestion, no nasal discharge, no 

sinus pressure, no vision change, no red eye.


Neck: No neck pain or rigidity.


Cardiovascular: no palpitations, chest pain


Respiratory: no cough, shortness of breath


Gastrointestinal: no hematochezia, abdominal pain


Genitourinary : no  frequency , no hematuria


Musculoskeletal: no joint swelling or muscle ache 


Integumentary: no rash, no pruritis


Neurological: no parathesias, no focal weakness


Endocrine: no cold or heat intolerance, no polyuria or polydipsia


Hematologic/Lymphatic: no easy bruising, no easy bleeding, no gland swelling


Allergic/Immunologic: no urticaria, no angioedema..





PAST MEDICAL HISTORY: diabetes





PAST SURGICAL HISTORY: None 





SOCIAL HISTORY: Denies alcohol, positive marijuana, +tobacco





FAMILY HISTORY: Hypertension








                                Laboratory Tests











  04/16/19 04/16/19





  04:47 04:47


 


WBC  11.2 H 


 


Hgb  10.7 L 


 


Hct  32.3 L 


 


Sodium   134 L


 


Potassium   4.3


 


Chloride   98.9


 


Carbon Dioxide   26


 


BUN   15


 


Creatinine   1.1


 


Glucose   414 H














Objective


                               Vital Signs - 12hr











  04/15/19 04/15/19 04/15/19





  20:10 21:07 23:37


 


Temperature 99.1 F 99 F 


 


Pulse Rate 109 H 106 H 100 H


 


Respiratory 18 18 18





Rate   


 


Blood Pressure 103/53 103/53 


 


Blood Pressure   129/60





[Left]   


 


O2 Sat by Pulse 98 98 98





Oximetry   














  04/16/19 04/16/19





  03:00 06:31


 


Temperature  98.4 F


 


Pulse Rate 86 84


 


Respiratory 17 17





Rate  


 


Blood Pressure  


 


Blood Pressure  130/71





[Left]  


 


O2 Sat by Pulse 97 100





Oximetry  














- Labs





                                 04/16/19 04:47





                                 04/16/19 04:47


                                 Diabetes panel











  04/15/19 04/16/19 Range/Units





  21:10 04:47 


 


Sodium  136 L  134 L  (137-145)  mmol/L


 


Potassium  4.0  4.3  (3.6-5.0)  mmol/L


 


Chloride  97.8 L  98.9  ()  mmol/L


 


Carbon Dioxide  27  26  (22-30)  mmol/L


 


BUN  15  15  (9-20)  mg/dL


 


Creatinine  1.2  1.1  (0.8-1.5)  mg/dL


 


Glucose  260 H  414 H  ()  mg/dL


 


Calcium  8.8  7.8 L  (8.4-10.2)  mg/dL


 


AST  17   (5-40)  units/L


 


ALT  11   (7-56)  units/L


 


Alkaline Phosphatase  143 H   ()  units/L


 


Total Protein  6.6   (6.3-8.2)  g/dL


 


Albumin  3.3 L   (3.9-5)  g/dL








                                  Calcium panel











  04/15/19 04/16/19 Range/Units





  21:10 04:47 


 


Calcium  8.8  7.8 L  (8.4-10.2)  mg/dL


 


Albumin  3.3 L   (3.9-5)  g/dL








                                 Pituitary panel











  04/15/19 04/16/19 Range/Units





  21:10 04:47 


 


Sodium  136 L  134 L  (137-145)  mmol/L


 


Potassium  4.0  4.3  (3.6-5.0)  mmol/L


 


Chloride  97.8 L  98.9  ()  mmol/L


 


Carbon Dioxide  27  26  (22-30)  mmol/L


 


BUN  15  15  (9-20)  mg/dL


 


Creatinine  1.2  1.1  (0.8-1.5)  mg/dL


 


Glucose  260 H  414 H  ()  mg/dL


 


Calcium  8.8  7.8 L  (8.4-10.2)  mg/dL








                                  Adrenal panel











  04/15/19 04/16/19 Range/Units





  21:10 04:47 


 


Sodium  136 L  134 L  (137-145)  mmol/L


 


Potassium  4.0  4.3  (3.6-5.0)  mmol/L


 


Chloride  97.8 L  98.9  ()  mmol/L


 


Carbon Dioxide  27  26  (22-30)  mmol/L


 


BUN  15  15  (9-20)  mg/dL


 


Creatinine  1.2  1.1  (0.8-1.5)  mg/dL


 


Glucose  260 H  414 H  ()  mg/dL


 


Calcium  8.8  7.8 L  (8.4-10.2)  mg/dL


 


Total Bilirubin  0.40   (0.1-1.2)  mg/dL


 


AST  17   (5-40)  units/L


 


ALT  11   (7-56)  units/L


 


Alkaline Phosphatase  143 H   ()  units/L


 


Total Protein  6.6   (6.3-8.2)  g/dL


 


Albumin  3.3 L   (3.9-5)  g/dL

## 2019-04-16 NOTE — CAT SCAN REPORT
PROCEDURE: CT ABDOMEN PELVIS W CON 

 

TECHNIQUE:  Routine axial imaging was obtained of the abdomen and pelvis following the intravenous in
jection of iodinated contrast. Delayed imaging was obtained through the kidneys ureters and bladder. 
Sagittal and coronal reconstructions were reviewed. 

 

HISTORY: rectum pain and mass 

 

COMPARISONS: None  

 

FINDINGS: 

 

The lung bases are clear. Pleural fluid is not seen. 

The liver is normal in size and reveals a peripherally enhancing subcapsular lesion in the posterior 
aspect of the right hepatic lobe measuring 2.6 cm diameter. This most likely represents an incidental
 hemangioma. The gallbladder biliary tree and pancreas appear normal. The spleen and adrenal glands a
ppear normal. The kidneys enhance normally. There is no evidence of hydroureter versus. The abdominal
 aorta and portal vein enhance normally. The bowel loops are normal in caliber and course. There is n
o evidence of free fluid or adenopathy. The appendix is not seen with certainty. 

 

In the pelvis the prostate gland and bladder appear normal. There is reticulation of the perirectal f
at. The mucosa appears prominent in the rectum. Proctitis is suspected. On the most caudal image ther
e is a 2 cm low density focus centrally which may represent a perianal abscess. The images do not ext
ension to the inferior aspect of the buttocks for definitive evaluation. 

The skeletal structures otherwise are unremarkable. 

 

IMPRESSION:  

 

Prominence of the mucosa in the rectum with reticulation of the perirectal fat suspicious for proctit
is. 

On the most caudal image there is a 2 cm low density focus in the perianal area. This may represent a
 small abscess. 

Additional imaging through this area is recommended to obtain the full extent of this lesion and proc
ess. 

2.6 cm benign hemangioma in the posterior aspect of the right hepatic lobe. 

 

This document is electronically signed by Markus Kovacs MD., April 16 2019 02:09:22 AM ET

## 2019-04-17 VITALS — DIASTOLIC BLOOD PRESSURE: 86 MMHG | SYSTOLIC BLOOD PRESSURE: 169 MMHG

## 2019-04-17 LAB
BASOPHILS # (AUTO): 0 K/MM3 (ref 0–0.1)
BASOPHILS NFR BLD AUTO: 0.3 % (ref 0–1.8)
EOSINOPHIL # BLD AUTO: 0.3 K/MM3 (ref 0–0.4)
EOSINOPHIL NFR BLD AUTO: 2.5 % (ref 0–4.3)
HCT VFR BLD CALC: 30.1 % (ref 35.5–45.6)
HGB BLD-MCNC: 10.1 GM/DL (ref 11.8–15.2)
LYMPHOCYTES # BLD AUTO: 1.8 K/MM3 (ref 1.2–5.4)
LYMPHOCYTES NFR BLD AUTO: 17.6 % (ref 13.4–35)
MCHC RBC AUTO-ENTMCNC: 34 % (ref 32–34)
MCV RBC AUTO: 84 FL (ref 84–94)
MONOCYTES # (AUTO): 0.8 K/MM3 (ref 0–0.8)
MONOCYTES % (AUTO): 8.1 % (ref 0–7.3)
PLATELET # BLD: 254 K/MM3 (ref 140–440)
RBC # BLD AUTO: 3.56 M/MM3 (ref 3.65–5.03)

## 2019-04-17 PROCEDURE — 0DJD8ZZ INSPECTION OF LOWER INTESTINAL TRACT, VIA NATURAL OR ARTIFICIAL OPENING ENDOSCOPIC: ICD-10-PCS | Performed by: INTERNAL MEDICINE

## 2019-04-17 PROCEDURE — 3E0234Z INTRODUCTION OF SERUM, TOXOID AND VACCINE INTO MUSCLE, PERCUTANEOUS APPROACH: ICD-10-PCS | Performed by: INTERNAL MEDICINE

## 2019-04-17 RX ADMIN — METRONIDAZOLE SCH MLS/HR: 5 INJECTION, SOLUTION INTRAVENOUS at 13:55

## 2019-04-17 RX ADMIN — Medication SCH ML: at 14:00

## 2019-04-17 RX ADMIN — METRONIDAZOLE SCH MLS/HR: 5 INJECTION, SOLUTION INTRAVENOUS at 05:54

## 2019-04-17 RX ADMIN — ENOXAPARIN SODIUM SCH: 100 INJECTION SUBCUTANEOUS at 11:14

## 2019-04-17 RX ADMIN — SODIUM CHLORIDE SCH MLS/HR: 0.45 INJECTION, SOLUTION INTRAVENOUS at 07:35

## 2019-04-17 NOTE — DISCHARGE SUMMARY
Providers





- Providers


Date of Admission: 


04/16/19 04:29





Attending physician: 


KRISTI VIVAR MD





                                        





04/16/19 03:44


Consult to Physician [CONS] Urgent 


   Comment: 


   Consulting Provider: AGUEDA BARNES


   Physician Instructions: 


   Reason For Exam: perianal abscess, proctitis





04/16/19 07:40


Consult to Physician [CONS] Routine 


   Comment: 


   Consulting Provider: EDEN HORN


   Physician Instructions: 


   Reason For Exam: proctitis





04/16/19 14:59


Consult to Physician [CONS] Routine 


   Comment: 


   Consulting Provider: SAURABH ANTHONY


   Physician Instructions: 


   Reason For Exam: proctitis, perirectal abscess











Primary care physician: 


[facility], [place]








Hospitalization


Condition: Stable


Hospital course: 





44-year-old man who presented with rectal pain and pain in the gluteal cleft 2 

days, associated with foul odor, fever and malaise





Diagnoses


Type 2 diabetes, uncontrolled with persistent hyperglycemia


Proctitis with perianal abscess





Plan


he was rx with IV antibiotics, general surgery input appreciated, recommended 

sitz bath, antibiotics and glycemic control


GI input appreciated, sp flex sig, no acute findings


-ID recommended continued iv abx, but he signed out ama


DVT prophylaxis, chemical





Disposition: DC-07 LEFT AGAINST MED ADVICE


Time spent for discharge: 33 mins





Core Measure Documentation





- Palliative Care


Palliative Care/ Comfort Measures: Not Applicable





- Core Measures


Any of the following diagnoses?: none





Exam





- Constitutional


Vitals: 


                                        











Temp Pulse Resp BP Pulse Ox


 


 98.4 F   84   16   169/86   100 


 


 04/17/19 12:48  04/17/19 13:25  04/17/19 13:25  04/17/19 13:25  04/17/19 13:25











General appearance: Present: no acute distress, well-nourished





- EENT


Eyes: Present: PERRL


ENT: hearing intact, clear oral mucosa





- Neck


Neck: Present: supple, normal ROM





- Respiratory


Respiratory effort: normal


Respiratory: bilateral: CTA





- Cardiovascular


Heart Sounds: Present: S1 & S2.  Absent: rub, click





- Extremities


Extremities: pulses symmetrical, No edema


Peripheral Pulses: within normal limits





- Abdominal


General gastrointestinal: Present: soft, non-tender, non-distended, normal bowel

 sounds


Male genitourinary: Present: normal





- Integumentary


Integumentary: Present: clear, warm, dry





- Musculoskeletal


Musculoskeletal: gait normal, strength equal bilaterally





- Psychiatric


Psychiatric: appropriate mood/affect, intact judgment & insight





- Neurologic


Neurologic: CNII-XII intact, moves all extremities





Plan


Follow up with: 


[facility], [place] [Primary Care Provider] - 7 Days


Forms:  AMA Form

## 2019-04-17 NOTE — PROGRESS NOTE
Assessment and Plan





Cultures:


04/16/2019 Wound culture: Gram negative rods





A/P:


44-year-old male with diabetes admitted with:





1) Proctitis with perianal abscess: noted on CT. Spontaneously draining. GI and 

General Surgery following. No h/o IBD. No h/o MSM behavior. Wound culture 

growing Gram negative Rods.  F/u ID and susceptibility.  S/p Flexible 

sigmoidoscopy was normal, no signs of inflammatory bowel disease. 





2) DM with hyperglycemia: recommend tight glycemic control





Recs:


Continue local care with sitz baths


follow up wound culture ID and susceptibility


Continue Ceftriaxone 2 gms every 24 hours, D2


Continue Flagyl 500mg every 8 hours, D2


Continue Vancomycin PK dosing,D2





[facility] 


M: [phone]


O:[phone]








Subjective


Date of service: 04/17/19


Interval history: 





Patient seen and examined., s/p Flexible Sigmoidoscopy. No acute distress but 

agitated.  





Objective





- Exam


Narrative Exam: 





Constitutional: Alert, agitated. No acute distress


Head, Ears, Nose: Normocephalic, atraumatic. External ears, nose normal


Eyes: Conjunctivae/corneas clear. No icterus. No ptosis.


Neck: Supple, no meningeal signs


Oral: no thrush, no ulcers


Cardiovascular: S1, S2 normal. 


Respiratory: Good air entry, clear to auscultation bilaterally


GI: Soft, non-tender; bowel sounds normal. No peritoneal signs. Perianal region 

with induration, severe tenderness, no active drainage


Musculoskeletal: No pedal edema, no cyanosis.


Skin: No rash or abscess


Hem/Lymphatic: No palpable cervical or supraclavicular nodes. No lymphangitis


Psych: Mood ok. Affect normal


Neurological: Awake, alert, oriented. No gross abnormality





- Constitutional


Vitals: 


                                   Vital Signs











Temp Pulse Resp BP Pulse Ox


 


 98.8 F   84   20   142/77   99 


 


 04/17/19 05:53  04/17/19 05:53  04/17/19 05:53  04/17/19 05:53  04/17/19 05:53








                           Temperature -Last 24 Hours











Temperature                    98.8 F


 


Temperature                    98.4 F


 


Temperature                    97.5 F


 


Temperature                    98.3 F

















- Labs


CBC & Chem 7: 


                                 04/17/19 05:25





                                 04/16/19 04:47


Labs: 


                              Abnormal lab results











  04/16/19 04/16/19 04/16/19 Range/Units





  11:55 17:36 21:22 


 


RBC     (3.65-5.03)  M/mm3


 


Hgb     (11.8-15.2)  gm/dl


 


Hct     (35.5-45.6)  %


 


Mono % (Auto)     (0.0-7.3)  %


 


Seg Neutrophils %     (40.0-70.0)  %


 


POC Glucose  330 H  311 H  213 H  ()  














  04/17/19 04/17/19 Range/Units





  05:25 07:36 


 


RBC  3.56 L   (3.65-5.03)  M/mm3


 


Hgb  10.1 L   (11.8-15.2)  gm/dl


 


Hct  30.1 L   (35.5-45.6)  %


 


Mono % (Auto)  8.1 H   (0.0-7.3)  %


 


Seg Neutrophils %  71.5 H   (40.0-70.0)  %


 


POC Glucose   296 H  ()

## 2019-04-17 NOTE — PROGRESS NOTE
Assessment and Plan


Assessment and plan: 





44-year-old man who presented with rectal pain and pain in the gluteal cleft 2 

days, associated with foul odor, fever and malaise





Diagnoses


Type 2 diabetes, uncontrolled with persistent hyperglycemia


Proctitis with perianal abscess





Plan


IV antibiotics, general surgery input appreciated, recommended sitz bath, 

antibiotics and glycemic control


GI input appreciated, for colonoscopy today


DVT prophylaxis, chemical





History


Interval history: 


Perirectal pain is improved


Review of systems


Constitutional: No fevers, no malaise, no joint pains





CVS: No chest pain, no orthopnea, no dyspnea on exertion, no pedal edema





GI: No abdominal pain, no diarrhea, no vomiting, no constipation





Respiratory: No shortness of breath, no wheezing, no coughing





Hospitalist Physical





- Physical exam


Narrative exam: 





General.: Appears well, no distress, nontoxic


HEENT: Moist mucous membranes, extraocular muscles intact, no lymphadenopathy


Neck: supple


Cardiac: S1-S2 heard


Lungs: clear to auscultation bilaterally


Abdomen: soft , nontender,  nondistended,  bowel sounds positive


Extremities: no edema clubbing or cyanosis


Skin: no rash or lesions


Neurologic: no gross focal deficits


Psych: calm, and cooperative





- Constitutional


Vitals: 


                                        











Temp Pulse Resp BP Pulse Ox


 


 98.8 F   68   15   128/82   100 


 


 04/17/19 11:30  04/17/19 11:30  04/17/19 11:30  04/17/19 11:30  04/17/19 11:30














Results





- Labs


CBC & Chem 7: 


                                 04/17/19 05:25





                                 04/16/19 04:47


Labs: 


                             Laboratory Last Values











WBC  10.3 K/mm3 (4.5-11.0)   04/17/19  05:25    


 


RBC  3.56 M/mm3 (3.65-5.03)  L  04/17/19  05:25    


 


Hgb  10.1 gm/dl (11.8-15.2)  L  04/17/19  05:25    


 


Hct  30.1 % (35.5-45.6)  L  04/17/19  05:25    


 


MCV  84 fl (84-94)   04/17/19  05:25    


 


MCH  29 pg (28-32)   04/17/19  05:25    


 


MCHC  34 % (32-34)   04/17/19  05:25    


 


RDW  13.8 % (13.2-15.2)   04/17/19  05:25    


 


Plt Count  254 K/mm3 (140-440)   04/17/19  05:25    


 


Lymph % (Auto)  17.6 % (13.4-35.0)   04/17/19  05:25    


 


Mono % (Auto)  8.1 % (0.0-7.3)  H  04/17/19  05:25    


 


Eos % (Auto)  2.5 % (0.0-4.3)   04/17/19  05:25    


 


Baso % (Auto)  0.3 % (0.0-1.8)   04/17/19  05:25    


 


Lymph #  1.8 K/mm3 (1.2-5.4)   04/17/19  05:25    


 


Mono #  0.8 K/mm3 (0.0-0.8)   04/17/19  05:25    


 


Eos #  0.3 K/mm3 (0.0-0.4)   04/17/19  05:25    


 


Baso #  0.0 K/mm3 (0.0-0.1)   04/17/19  05:25    


 


Seg Neutrophils %  71.5 % (40.0-70.0)  H  04/17/19  05:25    


 


Seg Neutrophils #  7.4 K/mm3 (1.8-7.7)   04/17/19  05:25    


 


Sodium  134 mmol/L (137-145)  L  04/16/19  04:47    


 


Potassium  4.3 mmol/L (3.6-5.0)   04/16/19  04:47    


 


Chloride  98.9 mmol/L ()   04/16/19  04:47    


 


Carbon Dioxide  26 mmol/L (22-30)   04/16/19  04:47    


 


Anion Gap  13 mmol/L  04/16/19  04:47    


 


BUN  15 mg/dL (9-20)   04/16/19  04:47    


 


Creatinine  1.1 mg/dL (0.8-1.5)   04/16/19  04:47    


 


Estimated GFR  > 60 ml/min  04/16/19  04:47    


 


BUN/Creatinine Ratio  14 %  04/16/19  04:47    


 


Glucose  414 mg/dL ()  H  04/16/19  04:47    


 


POC Glucose  271  ()  H  04/17/19  11:16    


 


Lactic Acid  1.70 mmol/L (0.7-2.0)   04/15/19  21:10    


 


Calcium  7.8 mg/dL (8.4-10.2)  L  04/16/19  04:47    


 


Total Bilirubin  0.40 mg/dL (0.1-1.2)   04/15/19  21:10    


 


AST  17 units/L (5-40)   04/15/19  21:10    


 


ALT  11 units/L (7-56)   04/15/19  21:10    


 


Alkaline Phosphatase  143 units/L ()  H  04/15/19  21:10    


 


Total Protein  6.6 g/dL (6.3-8.2)   04/15/19  21:10    


 


Albumin  3.3 g/dL (3.9-5)  L  04/15/19  21:10    


 


Albumin/Globulin Ratio  1.0 %  04/15/19  21:10    














Active Medications





- Current Medications


Current Medications: 














Generic Name Dose Route Start Last Admin





  Trade Name Freq  PRN Reason Stop Dose Admin


 


Acetaminophen  650 mg  04/16/19 04:40  04/16/19 22:02





  Tylenol  PO   650 mg





  Q4H PRN   Administration





  Pain MILD(1-3)/Fever >100.5/HA  


 


Dextrose  50 ml  04/16/19 04:40 





  D50w (25gm) Syringe  IV  





  PRN PRN  





  Hypoglycemia  


 


Enoxaparin Sodium  40 mg  04/16/19 10:00  04/17/19 11:14





  Lovenox  SUB-Q   Not Given





  QDAY@1000 ELIDA  


 


Sodium Chloride  1,000 mls @ 100 mls/hr  04/16/19 05:00  04/17/19 07:35





  Nacl 0.45% 1000 Ml  IV   100 mls/hr





  AS DIRECT ELIDA   Administration


 


Ceftriaxone Sodium  2 gm in 100 mls @ 200 mls/hr  04/16/19 17:30  04/16/19 18:14





  Rocephin/Ns 2 Gm/100 Ml  IV   200 mls/hr





  Q24H ELIDA   Administration





  Protocol  


 


Metronidazole  500 mg in 100 mls @ 100 mls/hr  04/16/19 18:00  04/17/19 05:54





  Flagyl 500 Mg/100 Ml  IV   100 mls/hr





  Q8H ELIDA   Administration





  Protocol  


 


Vancomycin HCl 1,500 mg/  530 mls @ 265 mls/hr  04/17/19 00:00  04/17/19 01:38





  Sodium Chloride  IV   265 mls/hr





  Q24H ELIDA   Administration


 


Sodium Chloride  1,000 mls @ 50 mls/hr  04/17/19 08:00  04/17/19 11:33





  Nacl 0.9% 1000 Ml  IV  04/18/19 07:59  50 mls/hr





  AS DIRECT ELIDA   Administration


 


Insulin Human Lispro  0 unit  04/16/19 07:30  04/17/19 09:43





  Humalog  SUB-Q   6 unit





  ACHS ELIDA   Administration





  Protocol  


 


Insulin Human NPH  10 unit  04/16/19 17:00  04/17/19 09:44





  Humulin N  SUB-Q   10 unit





  BIDDIAB ELIDA   Administration


 


Morphine Sulfate  2 mg  04/16/19 04:40 





  Morphine  IV  





  Q4H PRN  





  Pain, Moderate (4-6)  


 


Ondansetron HCl  4 mg  04/16/19 04:40 





  Zofran  IV  





  Q8H PRN  





  Nausea And Vomiting  


 


Pneumococcal Polyvalent Vaccine  0.5 ml  04/17/19 12:00 





  Pneumovax 23  IM  04/17/19 12:01 





  .ONCE ONE  


 


Sodium Chloride  10 ml  04/16/19 10:00  04/16/19 22:11





  Sodium Chloride Flush Syringe 10 Ml  IV   10 ml





  BID ELIDA   Administration


 


Sodium Chloride  10 ml  04/16/19 04:40 





  Sodium Chloride Flush Syringe 10 Ml  IV  





  PRN PRN  





  LINE FLUSH  














Nutrition/Malnutrition Assess





- Dietary Evaluation


Nutrition/Malnutrition Findings: 


                                        





Nutrition Notes                                            Start:  04/16/19 

10:17


Freq:                                                      Status: Active       




Protocol:                                                                       




 Document     04/16/19 10:17  CP  (Rec: 04/16/19 10:17  CP  [id number])


 Co-Sign      04/16/19 10:17  LP


 Nutrition Notes


     Need for Assessment generated from:         Low BMI


     Initial or Follow up                        Brief Note


     Subjective/Other Information                RD screen for low BMI. Pt has


                                                 a BMI of 28.1.


 Nutrition Intervention


     Revisit per MD consult or patient           Sign Off


      request:

## 2019-04-17 NOTE — OPERATIVE REPORT
Operative Report


Operative Report: 


Flexible Sigmoidoscopy Procedure Report





Date of procedure: 4/17/2019





Endoscopist: Trenton Helms





Pre-op diagnosis: Rectal pain, proctitis on CT scan (abnormal CT), lore-anal 

abscess





Post-op diagnosis:  Normal flex sig





Anesthesia: MAC





Complications: No immediate complications





Estimated blood loss: None





Procedure:  After consent was obtained, the patient was placed in the left 

lateral decubitus position.  The fujinon colonoscope was inserted into the 

patient's rectum under direct vision, and advanced to the descending colon 

without difficulty.  The patient tolerated the procedure well.  The views of the

mucosa were good.  The quality of prep was good.  The patient's vital signs were

monitored continuously throughout the procedure.





Findings:





The rectum and left side of the colon appeared normal.  There was no signs of 

inflammation/inflammatory bowel disease.





Impression:


1. Normal flex sig without signs of inflammatory bowel disease





Recommendations:


-further management of lore-rectal abscess per surgery and ID


-will sign off, please call as needed or with questions